# Patient Record
Sex: FEMALE | Race: BLACK OR AFRICAN AMERICAN | NOT HISPANIC OR LATINO | Employment: UNEMPLOYED | URBAN - METROPOLITAN AREA
[De-identification: names, ages, dates, MRNs, and addresses within clinical notes are randomized per-mention and may not be internally consistent; named-entity substitution may affect disease eponyms.]

---

## 2022-05-03 PROBLEM — K64.4 ANAL SKIN TAG: Status: ACTIVE | Noted: 2021-07-21

## 2022-05-03 PROBLEM — E04.1 THYROID NODULE: Status: ACTIVE | Noted: 2022-05-03

## 2022-05-03 PROBLEM — E11.49 TYPE 2 DIABETES MELLITUS WITH NEUROLOGICAL COMPLICATIONS: Status: ACTIVE | Noted: 2022-05-03

## 2022-05-03 PROBLEM — K21.9 GASTROESOPHAGEAL REFLUX DISEASE WITHOUT ESOPHAGITIS: Status: ACTIVE | Noted: 2022-05-03

## 2022-05-03 PROBLEM — G47.33 OSA (OBSTRUCTIVE SLEEP APNEA): Status: ACTIVE | Noted: 2022-05-03

## 2022-05-03 PROBLEM — K76.0 FATTY LIVER: Status: ACTIVE | Noted: 2022-05-03

## 2022-05-03 PROBLEM — K63.5 POLYP OF COLON: Status: ACTIVE | Noted: 2022-05-03

## 2022-05-03 PROBLEM — Z87.19 HISTORY OF PANCREATITIS: Status: ACTIVE | Noted: 2022-05-03

## 2022-05-03 PROBLEM — E11.9 TYPE 2 DIABETES MELLITUS WITHOUT COMPLICATION: Status: ACTIVE | Noted: 2020-06-18

## 2022-05-03 PROBLEM — H81.10 BENIGN PAROXYSMAL POSITIONAL VERTIGO: Status: ACTIVE | Noted: 2022-05-03

## 2022-07-21 DIAGNOSIS — U07.1 COVID-19 VIRUS DETECTED: ICD-10-CM

## 2022-07-25 ENCOUNTER — NURSE TRIAGE (OUTPATIENT)
Dept: ADMINISTRATIVE | Facility: CLINIC | Age: 62
End: 2022-07-25

## 2022-07-25 NOTE — TELEPHONE ENCOUNTER
Pt called with c/o chest pain and left arm pain pt said that the chest pain is mostly when she coughs or moves her arm.Pt said that it started 2 days ago and pt dx with covid on 7/20 pt triaged and care advice is to go to the Ed now and pt told the advcie and she said ok . Pt told to call back after if any SOB.CP, or fever        Reason for Disposition   Pain also in shoulder(s) or arm(s) or jaw    Additional Information   Negative: Dangerous mechanism of injury (e.g., fall > 10 feet or 3 meters, trampoline)(Exception: pain began > 1 hour after injury)   Negative: Sounds like a life-threatening emergency to the triager   Negative: SEVERE difficulty breathing (e.g., struggling for each breath, speaks in single words)   Negative: Passed out (i.e., fainted, collapsed and was not responding)   Negative: Difficult to awaken or acting confused (e.g., disoriented, slurred speech)   Negative: Shock suspected (e.g., cold/pale/clammy skin, too weak to stand, low BP, rapid pulse)   Negative: Chest pain lasting longer than 5 minutes and ANY of the following:* Over 44 years old* Over 30 years old and at least one cardiac risk factor (e.g., diabetes mellitus, high blood pressure, high cholesterol, smoker, or strong family history of heart disease)* History of heart disease (i.e., angina, heart attack, heart failure, bypass surgery, takes nitroglycerin)* Pain is crushing, pressure-like, or heavy   Negative: Heart beating < 50 beats per minute OR > 140 beats per minute   Negative: Visible sweat on face or sweat dripping down face   Negative: Sounds like a life-threatening emergency to the triager   Negative: SEVERE chest pain    Protocols used: CHEST PAIN-A-OH, TAILBONE INJURY-A-AH, CHEST INJURY - BENDING, LIFTING, OR ULBCPSCA-R-TS

## 2022-08-10 PROBLEM — M72.2 PLANTAR FIBROMATOSIS: Status: RESOLVED | Noted: 2022-08-10 | Resolved: 2022-08-10

## 2022-08-10 PROBLEM — M72.2 PLANTAR FIBROMATOSIS: Status: ACTIVE | Noted: 2022-08-10

## 2022-10-06 PROBLEM — G89.29 CHRONIC MIDLINE LOW BACK PAIN WITH BILATERAL SCIATICA: Status: ACTIVE | Noted: 2022-10-06

## 2022-10-06 PROBLEM — M54.42 CHRONIC MIDLINE LOW BACK PAIN WITH BILATERAL SCIATICA: Status: ACTIVE | Noted: 2022-10-06

## 2022-10-06 PROBLEM — M54.41 CHRONIC MIDLINE LOW BACK PAIN WITH BILATERAL SCIATICA: Status: ACTIVE | Noted: 2022-10-06

## 2022-12-13 PROBLEM — R41.3 MEMORY LOSS: Status: ACTIVE | Noted: 2022-12-13

## 2023-01-18 PROBLEM — M25.561 CHRONIC PAIN OF RIGHT KNEE: Status: ACTIVE | Noted: 2023-01-18

## 2023-01-18 PROBLEM — G89.29 CHRONIC PAIN OF RIGHT KNEE: Status: ACTIVE | Noted: 2023-01-18

## 2023-01-20 ENCOUNTER — DOCUMENTATION ONLY (OUTPATIENT)
Dept: RESEARCH | Facility: CLINIC | Age: 63
End: 2023-01-20
Payer: MEDICAID

## 2023-01-20 DIAGNOSIS — Z00.6 RESEARCH SUBJECT: Primary | ICD-10-CM

## 2023-01-20 NOTE — PROGRESS NOTES
PROPEL IT CONSENT DOCUMENTATION  Oncore Protocol 2022013: PROPEL IT  PROmoting Successful Weight Loss in Primary CarE in [place] (PROPEL) using Information Technology  : Alonso Nunez, PhD.  IRB of Record: [facility] ID# [facility] [id number]  [facility] Investigator: Carolina Galicia MD      Informed Consent Process   Name of Study Team member Present for discussion: N/A   Was the consent done electronically: yes  Is the potential subject currently enrolled in any other research trials (per Epic)? no  Prior to the Informed Consent being signed or any protocol required testing, procedure or intervention being performed, the following was completed or discussed:   Purpose of the study, qualifications to participate:  Study design, schedule and procedure:  Risks, benefits, alternative treatments, compensation and costs:  Confidentiality and HIPAA authorization for Release of Medical Records for the research trial/subjects right/study related injury:  Study related contact information:  Voluntary participation and withdrawal from the research trial at any time:  Patient has been offered the opportunity to ask questions regarding the study    and PI contact information given to subject:  Signed copy of consent form was provided to the subject via eMail:  Original consent or copy of electronic consent in subject's chart and uploaded in EPIC:        Keesha Reynoso electronically signed the informed consent form.   The consent form as reviewed by Radha Tariq  Name: ([facility] personnel reviewing consent form):  Ansley Reynolds, Associate Clinical Research Coordinator   Comments: See  for Consent Forms        I acknowledge that I have reviewed the informed consent form and viewed the volunteer's electronically signed and dated the signature section of the consent forms.yes    Participant ID (REDCap ID) added to Research Study yes]

## 2023-01-20 NOTE — PROGRESS NOTES
"PROPEL IT Weight Loss: Eligibility Confirmation  Remember to add Participant ID in Research Study  Age as of Today: 62 y.o.    Is patient age 40 to 70 years yes    Is patient race Black/: Epic: Black or      Primary care provider at [facility]: Charles Lawrence MD     Does the patient have an active [facility] portal account?  yes    Does the patient have prediabetes or Type 2 diabetes? yes  If yes, Based on: Type II diabetes ICD10 code    LAST HBA1C   Lab Results   Component Value Date    HGBA1C 7.5 (H) 12/13/2022          LAST BMI:   BMI Readings from Last 1 Encounters:   01/18/23 40.82 kg/m²      Was the patient's most recent BMI (within the past 12 months) between 30 and 50  yes    PCP REFERRAL  Did provider acknowledge or deny patient's participation? Need to Pend Order to PCP (after Consent)    LAST WEIGHT  (verify if this was an outpatient):   Wt Readings from Last 4 Encounters:   01/18/23 94.8 kg (209 lb)   12/13/22 95.3 kg (210 lb)   11/09/22 90.7 kg (200 lb)   10/06/22 90.3 kg (199 lb)          PROPEL-IT Screening Date (enter from REDCap): 1/20/2023  Does the patient have a baseline clinic weight collected within 4 weeks (34 days) of Screening Date?  Yes, has baseline clinical wt     *2. If the "Last Weight at Encounter" is not from outpatient visit or not within 34 days from today enter the next  eligible baseline clinic weight when it occurs.  Weight (kg)                        Date:                     *3. If the last weight is outside of the Screening date window (4 weeks /34 days)  when is the patient's next scheduled clinic appointment?  N/A, pt has baseline wt    Status Updated: 01/20/2023      "

## 2023-01-23 ENCOUNTER — RESEARCH ENCOUNTER (OUTPATIENT)
Dept: RESEARCH | Facility: CLINIC | Age: 63
End: 2023-01-23
Payer: MEDICAID

## 2023-01-23 DIAGNOSIS — Z00.6 RESEARCH SUBJECT: ICD-10-CM

## 2023-01-24 PROBLEM — K02.9 CARIES: Status: ACTIVE | Noted: 2023-01-24

## 2023-01-26 ENCOUNTER — TELEPHONE (OUTPATIENT)
Dept: RESEARCH | Facility: CLINIC | Age: 63
End: 2023-01-26
Payer: MEDICAID

## 2023-01-26 NOTE — TELEPHONE ENCOUNTER
Research Coordinator contacted MsNikunj Reynoso to do the Onboarding call.  Pt was not home and requested to be called back tomorrow, 1/27/2023.

## 2023-01-27 NOTE — TELEPHONE ENCOUNTER
"Scale Delivery Service Confirmation Date in REDCap: 1/25/2023  Address delivered To in Psychiatric: [address] [place] [address] [place] [address]    Study Overview explained ([facility] , [facility] team, [facility] Research Coordinator) yes    Scale Received? YES    Pt has "first weights" to share: no    RECEIVED SCALE  ONLY  BodyTrace Scale Basic Tips reviewed (scale surface, morning, minimal clothing, not waterproof) yes    Pt requested assistance (to talk through) for the scale setup:   Yes AND scale worked properly    Email Reminders explained: Explain any No/NA  Lets take you first PROPEL weight containing link to enter weights Yes (please explain)     Check off "Call completed" on Intervention Onboarding form to trigger "first weight email"Yes     EVERYONE  Health  visit scheduled for 2/3/2023 at 2:00 pm with Sabi Briggs.  Comments]: Unanswered questions:  Remind pt to save the Health  number to their contacts: [phone]  ?   Post Call Follow-up:  Call Complete.      Reminders:   Research Studies: Add Branch B Intervention  Research Study Calendar: Edit Plan> Visit 1 Planned for Date >select date of 1st scheduled visit  Episodes of Care: PROPEL; PROPEL IT - ARM B  Care Teams: Add users, Admission notification, End Date (2y +1M)    " Medications as discussed.   Follow up 6 weeks, sooner if needed.     [facility]  [phone]    [facility]  [phone]     [facility]  [phone]    We highly recommend [employer].  If you do not already have this, you can request access via the internet at [email or web address].     Your opinion matters!  Thank you for choosing [facility].  You may receive a survey about your experience today to evaluate our clinic.  Please take a moment to complete, we appreciate your feedback.  It was a pleasure to care for you today.

## 2023-02-10 ENCOUNTER — PATIENT OUTREACH (OUTPATIENT)
Dept: RESEARCH | Facility: CLINIC | Age: 63
End: 2023-02-10
Payer: MEDICARE

## 2023-02-10 DIAGNOSIS — Z00.6 RESEARCH SUBJECT: ICD-10-CM

## 2023-02-10 NOTE — PROGRESS NOTES
02/10/2023  1:55 PM    Patient Name Keesha Reynoso   Appointment Department [facility] PROPSARAH WEIGHT MANAGEMENT  Visit Type Audio (Virtual visit)    Clinic Weights   Wt Readings from Last 3 Encounters:   01/18/23 1140 94.8 kg (209 lb)   12/13/22 1450 95.3 kg (210 lb)   11/09/22 1313 90.7 kg (200 lb)     Last Reported Weights No data was found      [facility] INTERVENTION CONTACT:   Method of contact:  Phone Call   or Participant-Initiated Contact?:  -initiated contact  Type of intervention Contact:  Scheduled Session  Did the participant attend session?: Yes    Was the patient called within 15 minutes of the scheduled appointment?:  Yes  Is this a make-up session?: No    Which session materials covered in session?:  Session 1  Patient Reported Weight (From External Villa):  206  Patient-reported weekly minutes of physical activity::  0  Average Daily Steps: none reported.  Calorie Prescription::  1400  Safety Criteria Triggered:  None  Toolbox Triggered:  None  Weight Graph Stoplight Status for Dietary Adherence:  Green Light     Goals    None          Additional Notes:    Patient is excited to start the program. She notes her main motivation is to live a healthier life overall and hopefully get off diabetes medication. We were able to successfully log into her weight graph account and review this together. She states she will go grocery shopping tomorrow for meal plan items. She does note that she currently drinks sweet tea and regular soda on a daily basis. She also will have diet soda too. Patient thinks it is attainable for her to try unsweet tea with stevia and Coke Zero this next week and see how it goes.    Right now she has a right knee injury and has pain with walking - she starts physical therapy on Monday so we will implement movement routine gradually. In the meantime is interested in doing seated exercises focused on upper body - reviewed looking for [employer] videos.      Goals:  1) Choose unsweet tea  with stevia and Coke Zero instead of sweet tea and regular soda  2) Go to grocery and get meal plan items     Jessie Douglass MS, RD, LDN  [facility]   [phone]

## 2023-02-12 ENCOUNTER — PATIENT MESSAGE (OUTPATIENT)
Dept: RESEARCH | Facility: CLINIC | Age: 63
End: 2023-02-12
Payer: MEDICARE

## 2023-02-17 ENCOUNTER — PATIENT OUTREACH (OUTPATIENT)
Dept: RESEARCH | Facility: CLINIC | Age: 63
End: 2023-02-17
Payer: MEDICARE

## 2023-02-17 NOTE — PROGRESS NOTES
02/17/2023  1:56 PM    Patient Name Keesha Reynoso   Appointment Department [facility] PROP WEIGHT MANAGEMENT  Visit Type Audio (Virtual visit)    Clinic Weights   Wt Readings from Last 3 Encounters:   02/16/23 1426 94.3 kg (208 lb)   01/18/23 1140 94.8 kg (209 lb)   12/13/22 1450 95.3 kg (210 lb)     Last Reported Weights No data was found      [facility] PROP INTERVENTION CONTACT:   Method of contact:  Phone Call   or Participant-Initiated Contact?:  -initiated contact  Type of intervention Contact:  Scheduled Session  Did the participant attend session?: Yes    Was the patient called within 15 minutes of the scheduled appointment?:  Yes  Is this a make-up session?: No    Which session materials covered in session?:  Session 2  Patient Reported Weight (From External Villa):  205.2  Patient-reported weekly minutes of physical activity::  60  Average Daily Steps: none reported.  Calorie Prescription::  1400  Safety Criteria Triggered:  None  Weight Graph Stoplight Status for Dietary Adherence:  Yellow Light - Above the Zone     Goals         Choose unsweet tea with Stevia and Coke Zero instead of sweet tea and regular soda (pt-stated)                 Go shopping for meal plan items (pt-stated)                        Additional Notes:    Ms. Thao reports doing well this past week with following the meal plan. We reviewed her fluctuating weights and determined this was due to her moving the scale to a different location with uneven floors. Encouraged her to take the weights from her office going forward as those recorded weights appear to be most accurate. She started physical therapy this week and was able to also incorporate a couple upper body exercises too.    Goals:  1) Continue to follow pre-portioned meals  2) Continue to incorporate seated exercises 2-3 times per week    Jessie Douglass, MS, RD, LDN  [facility]   [phone]

## 2023-02-24 ENCOUNTER — PATIENT OUTREACH (OUTPATIENT)
Dept: RESEARCH | Facility: CLINIC | Age: 63
End: 2023-02-24
Payer: MEDICARE

## 2023-03-03 ENCOUNTER — PATIENT OUTREACH (OUTPATIENT)
Dept: RESEARCH | Facility: CLINIC | Age: 63
End: 2023-03-03
Payer: MEDICARE

## 2023-03-03 NOTE — PROGRESS NOTES
03/03/2023  1:43 PM    Patient Name Keesha Reynoso   Appointment Department [facility] PROPEL WEIGHT MANAGEMENT  Visit Type Audio (Virtual visit)    Clinic Weights   Wt Readings from Last 3 Encounters:   02/16/23 1426 94.3 kg (208 lb)   01/18/23 1140 94.8 kg (209 lb)   12/13/22 1450 95.3 kg (210 lb)     Last Reported Weights No data was found      [facility] INTERVENTION CONTACT:   Method of contact:  Phone Call   or Participant-Initiated Contact?:  -initiated contact  Type of intervention Contact:  Scheduled Session  Did the participant attend session?: No    If no, please select a reason::  Other (please comment) (No-show)  Safety Criteria Triggered:  None  Weight Graph Stoplight Status for Dietary Adherence:  Red Light     Goals         Choose unsweet tea with Stevia and Coke Zero instead of sweet tea and regular soda (pt-stated)                        Additional Notes:    Unable to reach patient before next scheduled session.    Sabi Briggs, MS, RD, LDN, [facility]  PROPEL[facility]

## 2023-03-03 NOTE — PROGRESS NOTES
03/03/2023  2:01 PM    Patient Name Keesha Reynoso   Appointment Department [facility] PROPEL WEIGHT MANAGEMENT  Visit Type Audio (Virtual visit)    Clinic Weights   Wt Readings from Last 3 Encounters:   02/16/23 1426 94.3 kg (208 lb)   01/18/23 1140 94.8 kg (209 lb)   12/13/22 1450 95.3 kg (210 lb)     Last Reported Weights No data was found      [facility] PROP INTERVENTION CONTACT:   Method of contact:  Phone Call   or Participant-Initiated Contact?:  -initiated contact  Type of intervention Contact:  Scheduled Session  Did the participant attend session?: Yes    Was the patient called within 15 minutes of the scheduled appointment?:  Yes  Is this a make-up session?: No    Which session materials covered in session?:  Session 3 and Session 4  Patient Reported Weight (From External Villa):  204.58  Patient-reported weekly minutes of physical activity::  90  Calorie Prescription::  1400  Safety Criteria Triggered:  None  Toolbox Triggered:  Physical Activity  Physical Activity::  Add steps to what you are already doing each day.  Weight Graph Stoplight Status for Dietary Adherence:  Red Light     Goals         Choose unsweet tea with Stevia and Coke Zero instead of sweet tea and regular soda (pt-stated)                        Additional Notes:    Patient reports that she is keeping her scale in the same place. She also reports that she has been in pain and going to PT and was taking pain medication that has caused constipation. She feels the constipation is leading to fluctuations in her weight. She has been successful with switching to zero calorie beverages and is happy with she was able to achieve this goal. She feels that she should be more active and states that she will try to do chair exercises on the days she does not have PT.     Goals:   Do chair exercises on the days you do not have physical therapy    Sabi Briggs, MS, RD, LDN, [facility]  [facility]   [phone]

## 2023-03-06 ENCOUNTER — PATIENT MESSAGE (OUTPATIENT)
Dept: RESEARCH | Facility: CLINIC | Age: 63
End: 2023-03-06
Payer: MEDICARE

## 2023-03-17 ENCOUNTER — PATIENT OUTREACH (OUTPATIENT)
Dept: RESEARCH | Facility: CLINIC | Age: 63
End: 2023-03-17
Payer: MEDICARE

## 2023-03-23 NOTE — PROGRESS NOTES
03/23/2023  8:49 AM    Patient Name Keesha Reynoso   Appointment Department [facility] PROP WEIGHT MANAGEMENT  Visit Type Audio (Virtual visit)    Clinic Weights   Wt Readings from Last 3 Encounters:   03/16/23 1039 94.1 kg (207 lb 6.4 oz)   03/06/23 1049 94.3 kg (208 lb)   02/16/23 1426 94.3 kg (208 lb)     Last Reported Weights No data was found      [facility] INTERVENTION CONTACT:   Method of contact:  Phone Call   or Participant-Initiated Contact?:  -initiated contact  Type of intervention Contact:  Scheduled Session  Did the participant attend session?: No    If no, please select a reason::  Other (please comment) (no-show)  Safety Criteria Triggered:  None  Weight Graph Stoplight Status for Dietary Adherence:  Red Light     Goals         Choose unsweet tea with Stevia and Coke Zero instead of sweet tea and regular soda (pt-stated)                 Do chair exercises on the days you do not have physical therapy (pt-stated)                        Additional Notes:    Unable to reach patient before next scheduled session.    Sabi Briggs, MS, RD, LDN, [facility]  [facility]

## 2023-03-24 ENCOUNTER — PATIENT OUTREACH (OUTPATIENT)
Dept: RESEARCH | Facility: CLINIC | Age: 63
End: 2023-03-24
Payer: MEDICARE

## 2023-03-24 NOTE — PROGRESS NOTES
03/24/2023  2:12 PM    Patient Name Keesha Reynoso   Appointment Department [facility] PROPEL WEIGHT MANAGEMENT  Visit Type Audio (Virtual visit)    Clinic Weights   Wt Readings from Last 3 Encounters:   03/16/23 1039 94.1 kg (207 lb 6.4 oz)   03/06/23 1049 94.3 kg (208 lb)   02/16/23 1426 94.3 kg (208 lb)     Last Reported Weights No data was found      [facility] PROP INTERVENTION CONTACT:   Method of contact:  Phone Call   or Participant-Initiated Contact?:  -initiated contact  Type of intervention Contact:  Scheduled Session  Did the participant attend session?: Yes    Was the patient called within 15 minutes of the scheduled appointment?:  Yes  Is this a make-up session?: No    Which session materials covered in session?:  Session 5 and Session 6  Patient Reported Weight (From External Villa):  205.3  Calorie Prescription::  1400  Safety Criteria Triggered:  None  Toolbox Triggered:  Adhearence to diet  Adherence to diet: Health  must choose at least two interventions from list::  Provision of a highly structured personalized meal plan  Weight Graph Stoplight Status for Dietary Adherence:  Red Light     Goals         Choose unsweet tea with Stevia and Coke Zero instead of sweet tea and regular soda (pt-stated)                 Do chair exercises on the days you do not have physical therapy (pt-stated)                        Additional Notes:    Patient states that she has been eating homemade soup and salad, but she has not been measuring. She states that she is having knee surgery on 3/27 and her sister will be helping her with all of her meals. Discussed red light toolbox goals and encouraged patient to eat only single serving and PCF's while her sister is there helping her. Also reviewed label reading.    Goals:  Follow the meal plan using only single serving and portion controlled foods.    Sabi Briggs, MS, RD, LDN, [facility]  [facility]   [phone]

## 2023-03-25 ENCOUNTER — PATIENT MESSAGE (OUTPATIENT)
Dept: RESEARCH | Facility: CLINIC | Age: 63
End: 2023-03-25
Payer: MEDICARE

## 2023-03-31 ENCOUNTER — PATIENT OUTREACH (OUTPATIENT)
Dept: RESEARCH | Facility: CLINIC | Age: 63
End: 2023-03-31
Payer: MEDICARE

## 2023-03-31 ENCOUNTER — PATIENT MESSAGE (OUTPATIENT)
Dept: RESEARCH | Facility: CLINIC | Age: 63
End: 2023-03-31

## 2023-03-31 NOTE — PROGRESS NOTES
03/31/2023  2:04 PM    Patient Name Keesha Reynoso   Appointment Department [facility] PROPEL WEIGHT MANAGEMENT  Visit Type Audio (Virtual visit)    Clinic Weights   Wt Readings from Last 3 Encounters:   03/16/23 1039 94.1 kg (207 lb 6.4 oz)   03/06/23 1049 94.3 kg (208 lb)   02/16/23 1426 94.3 kg (208 lb)     Last Reported Weights No data was found      [facility] INTERVENTION CONTACT:   Method of contact:  Phone Call   or Participant-Initiated Contact?:  -initiated contact  Type of intervention Contact:  Scheduled Session  Did the participant attend session?: Yes    Was the patient called within 15 minutes of the scheduled appointment?:  Yes  Is this a make-up session?: No    Which session materials covered in session?:  Session 7  Patient Reported Weight (From External Villa):  192.02  Patient-reported weekly minutes of physical activity::  0  Calorie Prescription::  1400  Safety Criteria Triggered:  None  Toolbox Triggered:  None  Weight Graph Stoplight Status for Dietary Adherence:  Green Light     Goals         Choose unsweet tea with Stevia and Coke Zero instead of sweet tea and regular soda (pt-stated)                 Do chair exercises on the days you do not have physical therapy (pt-stated)                        Additional Notes:    Patient had knee surgery on 3/27. She states that she is doing well. She has been eating only single serving and PCF's and feels this is going well.  She is not able to exercise due to her knee, but she states that she will start PT soon. Discussed session materials and patient reports no questions or concerns.    Goals:  Continue to use single serving and portion controlled foods to follow the meal plan    Sabi Briggs, MS, RD, LDN, [facility]  [facility]   [phone]

## 2023-04-10 ENCOUNTER — PATIENT OUTREACH (OUTPATIENT)
Dept: RESEARCH | Facility: CLINIC | Age: 63
End: 2023-04-10
Payer: MEDICARE

## 2023-04-10 NOTE — PROGRESS NOTES
04/10/2023  2:36 PM    Patient Name Keesha Reynoso   Appointment Department [facility] PROP WEIGHT MANAGEMENT  Visit Type Audio (Virtual visit)    Clinic Weights   Wt Readings from Last 3 Encounters:   03/16/23 1039 94.1 kg (207 lb 6.4 oz)   03/06/23 1049 94.3 kg (208 lb)   02/16/23 1426 94.3 kg (208 lb)     Last Reported Weights No data was found      [facility] INTERVENTION CONTACT:   Method of contact:  Phone Call   or Participant-Initiated Contact?:  -initiated contact  Type of intervention Contact:  Scheduled Session  Did the participant attend session?: No    If no, please select a reason::  Other (please comment) (no show)  Safety Criteria Triggered:  None  Weight Graph Stoplight Status for Dietary Adherence:  Green Light     Goals         Choose unsweet tea with Stevia and Coke Zero instead of sweet tea and regular soda (pt-stated)                 Do chair exercises on the days you do not have physical therapy (pt-stated)                        Additional Notes:    Unable to reach patient before next scheduled session.    Sabi Briggs, MS, RD, LDN, [facility]  [facility]

## 2023-04-14 ENCOUNTER — PATIENT OUTREACH (OUTPATIENT)
Dept: RESEARCH | Facility: CLINIC | Age: 63
End: 2023-04-14
Payer: MEDICARE

## 2023-04-14 NOTE — PROGRESS NOTES
04/14/2023  1:50 PM    Patient Name Keesha Reynoso   Appointment Department [facility] PROP WEIGHT MANAGEMENT  Visit Type Audio (Virtual visit)    Clinic Weights   Wt Readings from Last 3 Encounters:   03/16/23 1039 94.1 kg (207 lb 6.4 oz)   03/06/23 1049 94.3 kg (208 lb)   02/16/23 1426 94.3 kg (208 lb)     Last Reported Weights No data was found      [facility] INTERVENTION CONTACT:   Method of contact:  Phone Call   or Participant-Initiated Contact?:  -initiated contact  Type of intervention Contact:  Scheduled Session  Did the participant attend session?: No    If no, please select a reason::  Other (please comment) (no-show)  Safety Criteria Triggered:  None  Weight Graph Stoplight Status for Dietary Adherence:  Red Light     Goals         Choose unsweet tea with Stevia and Coke Zero instead of sweet tea and regular soda (pt-stated)                 Do chair exercises on the days you do not have physical therapy (pt-stated)                        Additional Notes:    Patient did not show for appointment and requested discharge from the study via [facility].    Sabi Briggs, MS, RD, LDN, [facility]  [facility]

## 2023-04-18 PROBLEM — M79.604 RIGHT LEG PAIN: Status: ACTIVE | Noted: 2023-04-18

## 2023-04-18 PROBLEM — R00.2 PALPITATIONS: Status: ACTIVE | Noted: 2023-04-18

## 2023-04-21 ENCOUNTER — PATIENT MESSAGE (OUTPATIENT)
Dept: RESEARCH | Facility: CLINIC | Age: 63
End: 2023-04-21

## 2023-06-22 ENCOUNTER — PATIENT MESSAGE (OUTPATIENT)
Dept: ADMINISTRATIVE | Facility: OTHER | Age: 63
End: 2023-06-22
Payer: MEDICARE

## 2023-06-23 ENCOUNTER — PATIENT MESSAGE (OUTPATIENT)
Dept: ADMINISTRATIVE | Facility: OTHER | Age: 63
End: 2023-06-23
Payer: MEDICARE

## 2024-02-14 ENCOUNTER — PATIENT MESSAGE (OUTPATIENT)
Dept: RESEARCH | Facility: CLINIC | Age: 64
End: 2024-02-14

## 2024-03-01 ENCOUNTER — PATIENT MESSAGE (OUTPATIENT)
Dept: RESEARCH | Facility: CLINIC | Age: 64
End: 2024-03-01

## 2024-03-01 ENCOUNTER — TELEPHONE (OUTPATIENT)
Dept: RESEARCH | Facility: CLINIC | Age: 64
End: 2024-03-01

## 2024-03-01 NOTE — TELEPHONE ENCOUNTER
"[employer] HEALTH SURVEY REMINDER    Pre-Call Chart Review: Copy survey link here:     Calling from the [employer] study to remind pt its time to fill out their study surveys.   We recently sent the links to your email and [facility] accounts. Once the health surveys are filled out, which will take about 15 to 30 minutes, we will send the $50 check for completion.   For Voicemail: If you have any questions, please call the [email or web address] study team at [place]. Their number is  [phone] or email [email or web address].     During Call:  Do you need me to resend the survey links again?   If patient has time, pause to send to [facility] and wait for pt to confirm receipt.   Did not speak to pt/Did not ask  *preferred email:    Share info with pt:  [x] N/A -did not speak with pt  [] [place] will submit a check request for $50 once surveys are complete. Currently it takes the check 3 - 4 weeks to arrive, and may take longer during holidays.   [] Remember to  add [email or web address] to your email contacts because messages may end up in your Junk/Trash/Spam folders     Issues identified  FUp actions:  [x] N/A  [] "Survey has already been completed" (Save & Return)  Ask  [facility] to  create new link  [] Never received check for previous surveys  Confirm mailing address & Ask [facility] to look into it  Enter Preferred Mailing Address:  [] Other issue:     Call Result;   Left voicemail      Follow-Up Procedures: NA  []Share Updated contact info: Send secure (encrypted) email of any issues (e.g. [employer]reed mailing address, best email address)  []Unable to reach/speak to: Send survey reminder message through the portal if unable to speak to pt.   "

## 2024-04-03 ENCOUNTER — PATIENT MESSAGE (OUTPATIENT)
Dept: RESEARCH | Facility: CLINIC | Age: 64
End: 2024-04-03

## 2024-04-12 ENCOUNTER — PATIENT MESSAGE (OUTPATIENT)
Dept: RESEARCH | Facility: CLINIC | Age: 64
End: 2024-04-12

## 2024-04-12 ENCOUNTER — TELEPHONE (OUTPATIENT)
Dept: RESEARCH | Facility: CLINIC | Age: 64
End: 2024-04-12

## 2024-04-12 NOTE — TELEPHONE ENCOUNTER
"[email or web address] HEALTH SURVEY REMINDER    Pre-Call Chart Review: Copy survey link here:     Calling from the [email or web address] study to remind pt its time to fill out their study surveys.   We recently sent the links to your email and [facility] accounts. Once the health surveys are filled out, which will take about 15 to 30 minutes, we will send the $50 check for completion.   For Voicemail: If you have any questions, please call the [employer] study team at [place]. Their number is  [phone] or email [email or web address].     During Call:  Do you need me to resend the survey links again?   If patient has time, pause to send to [facility] and wait for pt to confirm receipt.   Did not speak to pt/Did not ask  *preferred email:    Share info with pt:  [x] N/A -did not speak with pt  [] [place] will submit a check request for $50 once surveys are complete. Currently it takes the check 3 - 4 weeks to arrive, and may take longer during holidays.   [] Remember to  add [email or web address] to your email contacts because messages may end up in your Junk/Trash/Spam folders     Issues identified  FUp actions:  [x] N/A  [] "Survey has already been completed" (Save & Return)  Ask  [facility] to  create new link  [] Never received check for previous surveys  Confirm mailing address & Ask [facility] to look into it  Enter Preferred Mailing Address:  [] Other issue:     Call Result;   Left voicemail      Follow-Up Procedures: NA  []Share Updated contact info: Send secure (encrypted) email of any issues (e.g. [employer] mailing address, best email address)  []Unable to reach/speak to: Send survey reminder message through the portal if unable to speak to pt.   "

## 2024-10-09 ENCOUNTER — PATIENT MESSAGE (OUTPATIENT)
Dept: ADMINISTRATIVE | Facility: HOSPITAL | Age: 64
End: 2024-10-09

## 2025-01-24 ENCOUNTER — PATIENT MESSAGE (OUTPATIENT)
Dept: RESEARCH | Facility: CLINIC | Age: 65
End: 2025-01-24

## 2025-02-05 ENCOUNTER — PATIENT MESSAGE (OUTPATIENT)
Dept: RESEARCH | Facility: CLINIC | Age: 65
End: 2025-02-05

## 2025-05-16 ENCOUNTER — PATIENT OUTREACH (OUTPATIENT)
Dept: ADMINISTRATIVE | Facility: HOSPITAL | Age: 65
End: 2025-05-16

## 2025-05-16 DIAGNOSIS — E11.9 TYPE 2 DIABETES MELLITUS WITHOUT COMPLICATION, WITH LONG-TERM CURRENT USE OF INSULIN: Primary | ICD-10-CM

## 2025-05-16 DIAGNOSIS — Z79.4 TYPE 2 DIABETES MELLITUS WITHOUT COMPLICATION, WITH LONG-TERM CURRENT USE OF INSULIN: Primary | ICD-10-CM
